# Patient Record
Sex: FEMALE | Race: WHITE | NOT HISPANIC OR LATINO | Employment: OTHER | URBAN - METROPOLITAN AREA
[De-identification: names, ages, dates, MRNs, and addresses within clinical notes are randomized per-mention and may not be internally consistent; named-entity substitution may affect disease eponyms.]

---

## 2020-08-24 ENCOUNTER — OFFICE VISIT (OUTPATIENT)
Dept: ORTHOPEDIC SURGERY | Facility: CLINIC | Age: 75
End: 2020-08-24

## 2020-08-24 VITALS
BODY MASS INDEX: 30.53 KG/M2 | HEIGHT: 66 IN | HEART RATE: 79 BPM | WEIGHT: 190 LBS | DIASTOLIC BLOOD PRESSURE: 82 MMHG | SYSTOLIC BLOOD PRESSURE: 159 MMHG

## 2020-08-24 DIAGNOSIS — M19.032 PRIMARY OSTEOARTHRITIS OF LEFT WRIST: ICD-10-CM

## 2020-08-24 DIAGNOSIS — S52.572A OTHER CLOSED INTRA-ARTICULAR FRACTURE OF DISTAL END OF LEFT RADIUS, INITIAL ENCOUNTER: ICD-10-CM

## 2020-08-24 DIAGNOSIS — M25.532 LEFT WRIST PAIN: Primary | ICD-10-CM

## 2020-08-24 PROCEDURE — 73110 X-RAY EXAM OF WRIST: CPT | Performed by: ORTHOPAEDIC SURGERY

## 2020-08-24 PROCEDURE — 25600 CLTX DST RDL FX/EPHYS SEP WO: CPT | Performed by: ORTHOPAEDIC SURGERY

## 2020-08-24 PROCEDURE — 99203 OFFICE O/P NEW LOW 30 MIN: CPT | Performed by: ORTHOPAEDIC SURGERY

## 2020-08-24 RX ORDER — TRAMADOL HYDROCHLORIDE 50 MG/1
50 TABLET ORAL EVERY 6 HOURS PRN
COMMUNITY

## 2020-08-24 RX ORDER — MELATONIN
1000 DAILY
COMMUNITY

## 2020-08-24 RX ORDER — POTASSIUM CHLORIDE 750 MG/1
10 TABLET, EXTENDED RELEASE ORAL 2 TIMES DAILY
COMMUNITY

## 2020-08-24 RX ORDER — SIMVASTATIN 80 MG
80 TABLET ORAL NIGHTLY
COMMUNITY

## 2020-08-24 RX ORDER — CELECOXIB 200 MG/1
200 CAPSULE ORAL DAILY
COMMUNITY

## 2020-08-24 RX ORDER — MONTELUKAST SODIUM 10 MG/1
10 TABLET ORAL NIGHTLY
COMMUNITY

## 2020-08-24 RX ORDER — UREA 10 %
9 LOTION (ML) TOPICAL
COMMUNITY

## 2020-08-24 RX ORDER — HYDROCODONE BITARTRATE AND ACETAMINOPHEN 5; 325 MG/1; MG/1
1 TABLET ORAL EVERY 4 HOURS PRN
Qty: 36 TABLET | Refills: 0 | Status: SHIPPED | OUTPATIENT
Start: 2020-08-24

## 2020-08-24 RX ORDER — CETIRIZINE HYDROCHLORIDE 10 MG/1
10 TABLET ORAL DAILY
COMMUNITY

## 2020-08-24 RX ORDER — GUAIFENESIN 600 MG/1
600 TABLET, EXTENDED RELEASE ORAL 2 TIMES DAILY
COMMUNITY

## 2020-08-24 RX ORDER — ESCITALOPRAM OXALATE 20 MG/1
20 TABLET ORAL DAILY
COMMUNITY

## 2020-08-24 RX ORDER — LISINOPRIL AND HYDROCHLOROTHIAZIDE 12.5; 1 MG/1; MG/1
1 TABLET ORAL DAILY
COMMUNITY
End: 2020-10-28 | Stop reason: ALTCHOICE

## 2020-08-24 RX ORDER — ESOMEPRAZOLE MAGNESIUM 40 MG/1
40 CAPSULE, DELAYED RELEASE ORAL
COMMUNITY

## 2020-08-24 NOTE — PROGRESS NOTES
Subjective: Left wrist pain     Patient ID: Tracey Teran is a 75 y.o. female.    Chief Complaint:    History of Present Illness 75-year-old female right-hand-dominant seen by me today for her left wrist which he injured last Wednesday when she fell off the deck of her home.  Deck is only about a foot off the ground when she tripped landing on her left arm developed immediate pain and discomfort.  Was seen in the emergency room at [facility] x-ray placed in a splint now presents here.  Patient does give a history of fracture in that wrist approximately 20 years ago that was treated with a cast and has had occasional arthritic type complaints in the wrist prior to the fall.  Initially had some numbness in the little finger but she states that is now resolved.  She is in a sugar tong short arm splint.  No other associated injuries with the fall.       Social History     Occupational History   • Not on file   Tobacco Use   • Smoking status: Not on file   Substance and Sexual Activity   • Alcohol use: Not on file   • Drug use: Not on file   • Sexual activity: Not on file      Review of Systems   Constitutional: Negative for chills, diaphoresis, fever and unexpected weight change.   HENT: Negative for hearing loss, nosebleeds, sore throat and tinnitus.    Eyes: Negative for pain and visual disturbance.   Respiratory: Negative for cough, shortness of breath and wheezing.    Cardiovascular: Negative for chest pain and palpitations.   Gastrointestinal: Negative for abdominal pain, diarrhea, nausea and vomiting.   Endocrine: Negative for cold intolerance, heat intolerance and polydipsia.   Genitourinary: Negative for difficulty urinating, dysuria and hematuria.   Musculoskeletal: Positive for arthralgias and myalgias. Negative for joint swelling.   Skin: Negative for rash and wound.   Allergic/Immunologic: Negative for environmental allergies.   Neurological: Negative for dizziness, syncope and numbness.    Hematological: Does not bruise/bleed easily.   Psychiatric/Behavioral: Negative for dysphoric mood and sleep disturbance. The patient is not nervous/anxious.          Past Medical History:   Diagnosis Date   • COPD (chronic obstructive pulmonary disease) (CMS/HCC)    • Diabetes (CMS/HCC)    • Fracture of wrist     left wrist 30 years ago   • Osteoarthritis      Past Surgical History:   Procedure Laterality Date   • APPENDECTOMY     • CHOLECYSTECTOMY     • HYSTERECTOMY       History reviewed. No pertinent family history.      Objective:  Vitals:    08/24/20 1024   BP: 159/82   Pulse: 79         08/24/20  1024   Weight: 86.2 kg (190 lb)     Body mass index is 30.67 kg/m².        Ortho Exam   Review of the x-rays from [facility] AP lateral oblique show comminuted intra-articular fracture of the left distal radius with moderate displacement.  Does show significant arthritic changes in the radiocarpal junction of the wrist.  Repeat AP and lateral x-rays are done in the office and show the fracture with some reduction of the distal radial and ulnar fracture.  Alignment is fairly well obtained.  Again it shows significant arthritic changes in the radiocarpal junction.  She is alert and oriented x3.  Has normocephalic and sclerae clear.  Examination of the wrist out of the splint shows moderate swelling to the wrist and hand with some ecchymosis on the dorsum of the wrist.  She has good capillary refill and there is no sensory loss and she has no motor loss in the hand.  She has full flexion and extension but there is moderate pain with supination.  Very limited dorsi or volar flexion of the wrist secondary to pain.  The skin is intact.  Has been taken hydrocodone for pain control.    Assessment:        1. Left wrist pain    2. Primary osteoarthritis of left wrist    3. Other closed intra-articular fracture of distal end of left radius, initial encounter           Plan: Over 20 minutes was spent with the patient and  the  face-to-face reviewing her x-rays her exam and history.  Again she has significant pre-existing arthritis in that radiocarpal joint of that wrist which was covered in some arthritic pains prior to the fall.  She has a significant comminuted intra-articular fracture involving the radius and a fracture of the distal ulna.  She has fairly good alignment noted on x-rays today.  After reviewing treatment options my recommendation and the patient and  is in agreement is to receive just nonoperative management in a long-arm cast.  Since the alignment is satisfactory I do think this was appropriate treatment.  If she has significant pain after the healing is completed it is due to the arthritis of the wrist and then she can be referred to hand surgeon for possible wrist fusion but anatomically reducing the wrist or distal radial fracture at this time I do not think is going to significant improve the outcome and never agreement.  Return in 3 weeks with an x-ray in the cast.  Refilled her pain medication.  Total should be casted somewhere between 6 to 10 weeks.  Answered all questions            Work Status:    CRISTIANA query complete.    Orders:  Orders Placed This Encounter   Procedures   • XR Wrist 3+ View Left       Medications:  New Medications Ordered This Visit   Medications   • HYDROcodone-acetaminophen (NORCO) 5-325 MG per tablet     Sig: Take 1 tablet by mouth Every 4 (Four) Hours As Needed for Moderate Pain  or Severe Pain .     Dispense:  36 tablet     Refill:  0       Followup:  Return in about 3 weeks (around 9/14/2020).          Dictated utilizing Dragon dictation

## 2020-09-14 ENCOUNTER — OFFICE VISIT (OUTPATIENT)
Dept: ORTHOPEDIC SURGERY | Facility: CLINIC | Age: 75
End: 2020-09-14

## 2020-09-14 DIAGNOSIS — M25.532 LEFT WRIST PAIN: ICD-10-CM

## 2020-09-14 DIAGNOSIS — M19.032 PRIMARY OSTEOARTHRITIS OF LEFT WRIST: ICD-10-CM

## 2020-09-14 DIAGNOSIS — S52.572A OTHER CLOSED INTRA-ARTICULAR FRACTURE OF DISTAL END OF LEFT RADIUS, INITIAL ENCOUNTER: Primary | ICD-10-CM

## 2020-09-14 PROCEDURE — 99024 POSTOP FOLLOW-UP VISIT: CPT | Performed by: ORTHOPAEDIC SURGERY

## 2020-09-14 PROCEDURE — 73110 X-RAY EXAM OF WRIST: CPT | Performed by: ORTHOPAEDIC SURGERY

## 2020-09-14 NOTE — PROGRESS NOTES
Subjective: Left distal radial fracture     Patient ID: Tracey Teran is a 75 y.o. female.    Chief Complaint:    History of Present Illness 75-year-old female is 3 weeks out from her injury.  Is having minimal pain in a long-arm cast.       Social History     Occupational History   • Not on file   Tobacco Use   • Smoking status: Not on file   Substance and Sexual Activity   • Alcohol use: Not on file   • Drug use: Not on file   • Sexual activity: Not on file      Review of Systems   Constitutional: Negative for chills, diaphoresis, fever and unexpected weight change.   HENT: Negative for hearing loss, nosebleeds, sore throat and tinnitus.    Eyes: Negative for pain and visual disturbance.   Respiratory: Negative for cough, shortness of breath and wheezing.    Cardiovascular: Negative for chest pain and palpitations.   Gastrointestinal: Negative for abdominal pain, diarrhea, nausea and vomiting.   Endocrine: Negative for cold intolerance, heat intolerance and polydipsia.   Genitourinary: Negative for difficulty urinating, dysuria and hematuria.   Musculoskeletal: Positive for arthralgias. Negative for joint swelling and myalgias.   Skin: Negative for rash and wound.   Allergic/Immunologic: Negative for environmental allergies.   Neurological: Negative for dizziness, syncope and numbness.   Hematological: Does not bruise/bleed easily.   Psychiatric/Behavioral: Negative for dysphoric mood and sleep disturbance. The patient is not nervous/anxious.          Past Medical History:   Diagnosis Date   • COPD (chronic obstructive pulmonary disease) (CMS/[facility])    • Diabetes (CMS/[facility])    • Fracture of wrist     left wrist 30 years ago   • Osteoarthritis      Past Surgical History:   Procedure Laterality Date   • APPENDECTOMY     • CHOLECYSTECTOMY     • HYSTERECTOMY       No family history on file.      Objective:  There were no vitals filed for this visit.  There were no vitals filed for this visit.  There is no height or weight  on file to calculate BMI.        Ortho Exam   AP lateral oblique views of the left wrist does show some collapse of the distal radial fragment but alignment is still within acceptable limits.  Abundant callus is not seen.  She is alert and oriented x3.  She has no motor or sensory deficit she is having minimal to no pain.  Cast is in good repair.    Assessment:        1. Other closed intra-articular fracture of distal end of left radius, initial encounter    2. Left wrist pain    3. Primary osteoarthritis of left wrist           Plan: Return in 3 weeks with a repeat x-ray.  Once I see callus formation at the fracture site we can convert to a short arm cast whether that on the next visit that thereafter x-ray will determine.  Answered all questions            Work Status:    CRISTIANA query complete.    Orders:  Orders Placed This Encounter   Procedures   • XR Wrist 3+ View Left       Medications:  No orders of the defined types were placed in this encounter.      Followup:  Return in about 3 weeks (around 10/5/2020).          Dictated utilizing Dragon dictation

## 2020-10-05 ENCOUNTER — OFFICE VISIT (OUTPATIENT)
Dept: ORTHOPEDIC SURGERY | Facility: CLINIC | Age: 75
End: 2020-10-05

## 2020-10-05 VITALS — HEIGHT: 66 IN | BODY MASS INDEX: 30.53 KG/M2 | WEIGHT: 190 LBS

## 2020-10-05 DIAGNOSIS — S52.572A OTHER CLOSED INTRA-ARTICULAR FRACTURE OF DISTAL END OF LEFT RADIUS, INITIAL ENCOUNTER: Primary | ICD-10-CM

## 2020-10-05 DIAGNOSIS — M19.032 PRIMARY OSTEOARTHRITIS OF LEFT WRIST: ICD-10-CM

## 2020-10-05 PROCEDURE — 99024 POSTOP FOLLOW-UP VISIT: CPT | Performed by: ORTHOPAEDIC SURGERY

## 2020-10-05 PROCEDURE — 29075 APPL CST ELBW FNGR SHORT ARM: CPT | Performed by: ORTHOPAEDIC SURGERY

## 2020-10-05 PROCEDURE — 73110 X-RAY EXAM OF WRIST: CPT | Performed by: ORTHOPAEDIC SURGERY

## 2020-10-05 RX ORDER — PROMETHAZINE HYDROCHLORIDE 25 MG/1
TABLET ORAL
COMMUNITY
Start: 2020-09-09

## 2020-10-05 RX ORDER — METFORMIN HYDROCHLORIDE 500 MG/1
TABLET, EXTENDED RELEASE ORAL
COMMUNITY
Start: 2020-09-29

## 2020-10-05 NOTE — PROGRESS NOTES
Subjective: Left distal radial fracture, osteoarthritis wrist     Patient ID: Tracey Teran is a 75 y.o. female.    Chief Complaint:    History of Present Illness patient little 4 weeks out from her injury.  Is doing well with minimal to no complaints of pain in the long-arm cast       Social History     Occupational History   • Not on file   Tobacco Use   • Smoking status: Never Smoker   • Smokeless tobacco: Never Used   Substance and Sexual Activity   • Alcohol use: Defer   • Drug use: Defer   • Sexual activity: Defer      Review of Systems   Constitutional: Negative for appetite change, chills, diaphoresis, fever and unexpected weight change.   HENT: Negative for hearing loss, nosebleeds, sore throat and tinnitus.    Eyes: Negative for pain and visual disturbance.   Respiratory: Negative for cough, shortness of breath and wheezing.    Cardiovascular: Negative for chest pain and palpitations.   Gastrointestinal: Negative for abdominal pain, diarrhea, nausea and vomiting.   Endocrine: Negative for cold intolerance, heat intolerance and polydipsia.   Genitourinary: Negative for difficulty urinating, dysuria and hematuria.   Musculoskeletal: Positive for arthralgias and myalgias. Negative for joint swelling and neck pain.   Skin: Negative for rash and wound.   Allergic/Immunologic: Negative for environmental allergies.   Neurological: Negative for dizziness, syncope and numbness.   Hematological: Does not bruise/bleed easily.   Psychiatric/Behavioral: Negative for dysphoric mood and sleep disturbance. The patient is not nervous/anxious.          Past Medical History:   Diagnosis Date   • COPD (chronic obstructive pulmonary disease) (CMS/[facility])    • Diabetes (CMS/[facility])    • Fracture of wrist     left wrist 30 years ago   • Osteoarthritis      Past Surgical History:   Procedure Laterality Date   • APPENDECTOMY     • CHOLECYSTECTOMY     • HYSTERECTOMY       History reviewed. No pertinent family history.      Objective:  There  were no vitals filed for this visit.      10/05/20  1124   Weight: 86.2 kg (190 lb)     Body mass index is 30.67 kg/m².        Ortho Exam   X-rays show some early callus summation at the fracture site compared to previous x-rays.  She has no motor or sensory deficit.  Exam out of the cast shows the skin intact and cool to touch good capillary refill    Assessment:        1. Other closed intra-articular fracture of distal end of left radius, initial encounter    2. Primary osteoarthritis of left wrist           Plan: Short arm cast was applied.  Return in 3 weeks with an x-ray out of the cast will probably convert to a wrist immobilizer at that time.  Answered all questions            Work Status:    CRISTIANA query complete.    Orders:  Orders Placed This Encounter   Procedures   • XR Wrist 3+ View Left       Medications:  No orders of the defined types were placed in this encounter.      Followup:  Return in about 3 weeks (around 10/26/2020).          Dictated utilizing Dragon dictation

## 2020-10-28 ENCOUNTER — OFFICE VISIT (OUTPATIENT)
Dept: ORTHOPEDIC SURGERY | Facility: CLINIC | Age: 75
End: 2020-10-28

## 2020-10-28 VITALS — BODY MASS INDEX: 30.53 KG/M2 | WEIGHT: 190 LBS | HEIGHT: 66 IN

## 2020-10-28 DIAGNOSIS — M25.532 WRIST PAIN, LEFT: Primary | ICD-10-CM

## 2020-10-28 DIAGNOSIS — M25.532 LEFT WRIST PAIN: ICD-10-CM

## 2020-10-28 DIAGNOSIS — S52.572A OTHER CLOSED INTRA-ARTICULAR FRACTURE OF DISTAL END OF LEFT RADIUS, INITIAL ENCOUNTER: ICD-10-CM

## 2020-10-28 DIAGNOSIS — M19.032 PRIMARY OSTEOARTHRITIS OF LEFT WRIST: ICD-10-CM

## 2020-10-28 PROCEDURE — 99024 POSTOP FOLLOW-UP VISIT: CPT | Performed by: ORTHOPAEDIC SURGERY

## 2020-10-28 PROCEDURE — 73110 X-RAY EXAM OF WRIST: CPT | Performed by: ORTHOPAEDIC SURGERY

## 2020-10-28 RX ORDER — INFLUENZA A VIRUS A/MICHIGAN/45/2015 X-275 (H1N1) ANTIGEN (FORMALDEHYDE INACTIVATED), INFLUENZA A VIRUS A/SINGAPORE/INFIMH-16-0019/2016 IVR-186 (H3N2) ANTIGEN (FORMALDEHYDE INACTIVATED), INFLUENZA B VIRUS B/PHUKET/3073/2013 ANTIGEN (FORMALDEHYDE INACTIVATED), AND INFLUENZA B VIRUS B/MARYLAND/15/2016 BX-69A ANTIGEN (FORMALDEHYDE INACTIVATED) 60; 60; 60; 60 UG/.7ML; UG/.7ML; UG/.7ML; UG/.7ML
INJECTION, SUSPENSION INTRAMUSCULAR
COMMUNITY
Start: 2020-10-27

## 2020-10-28 RX ORDER — LISINOPRIL AND HYDROCHLOROTHIAZIDE 20; 12.5 MG/1; MG/1
TABLET ORAL
COMMUNITY
Start: 2020-10-19

## 2020-10-28 NOTE — PROGRESS NOTES
Subjective: Left distal radial fracture     Patient ID: Tracey Teran is a 75 y.o. female.    Chief Complaint:    History of Present Illness patient is over 2months is doing well.  Experiencing minimal pain in the left wrist       Social History     Occupational History   • Not on file   Tobacco Use   • Smoking status: Never Smoker   • Smokeless tobacco: Never Used   Substance and Sexual Activity   • Alcohol use: Defer   • Drug use: Defer   • Sexual activity: Defer      Review of Systems   Constitutional: Negative for chills, diaphoresis and unexpected weight change.   HENT: Negative for hearing loss, nosebleeds, sore throat and tinnitus.    Eyes: Negative for pain and visual disturbance.   Respiratory: Negative for cough, shortness of breath and wheezing.    Cardiovascular: Negative for chest pain and palpitations.   Gastrointestinal: Negative for abdominal pain, diarrhea, nausea and vomiting.   Endocrine: Negative for cold intolerance, heat intolerance and polydipsia.   Genitourinary: Negative for difficulty urinating, dyspareunia and hematuria.   Musculoskeletal: Positive for arthralgias and myalgias.   Skin: Negative for rash and wound.   Allergic/Immunologic: Negative for environmental allergies.   Neurological: Negative for dizziness, syncope and numbness.   Hematological: Does not bruise/bleed easily.   Psychiatric/Behavioral: Negative for dysphoric mood and sleep disturbance. The patient is not nervous/anxious.            Objective:      Ortho Exam   X-ray shows a comminuted fracture alignment is acceptable.  Some callus is seen on today's x-ray compared to previous x-rays.  Exam out of the cast shows no motor or sensory deficit.  There is a mild deformity but again she is not having pain and has active dorsi and volar flexion with no discomfort.  Good capillary refill.    Assessment:        1. Wrist pain, left    2. Other closed intra-articular fracture of distal end of left radius, initial encounter    3.  Left wrist pain    4. Primary osteoarthritis of left wrist           Plan: Placed in a wrist immobilizer the can be removed for range of motion exercises.  She is to wear the wrist immobilizer when out of the house.  Return to see me in 4 weeks with a final x-ray.  Answered all questions                      Dictated utilizing Dragon dictation